# Patient Record
Sex: FEMALE
[De-identification: names, ages, dates, MRNs, and addresses within clinical notes are randomized per-mention and may not be internally consistent; named-entity substitution may affect disease eponyms.]

---

## 2017-11-24 ENCOUNTER — HOSPITAL ENCOUNTER (OUTPATIENT)
Dept: HOSPITAL 41 - JD.SDS | Age: 52
Discharge: HOME | End: 2017-11-24
Attending: OBSTETRICS & GYNECOLOGY
Payer: COMMERCIAL

## 2017-11-24 DIAGNOSIS — Z79.899: ICD-10-CM

## 2017-11-24 DIAGNOSIS — N81.6: ICD-10-CM

## 2017-11-24 DIAGNOSIS — N81.10: ICD-10-CM

## 2017-11-24 DIAGNOSIS — N83.331: Primary | ICD-10-CM

## 2017-11-24 DIAGNOSIS — Z87.891: ICD-10-CM

## 2017-11-24 DIAGNOSIS — F32.9: ICD-10-CM

## 2017-11-24 DIAGNOSIS — Z98.890: ICD-10-CM

## 2017-11-24 PROCEDURE — 87086 URINE CULTURE/COLONY COUNT: CPT

## 2017-11-24 PROCEDURE — 58661 LAPAROSCOPY REMOVE ADNEXA: CPT

## 2017-11-24 PROCEDURE — 85025 COMPLETE CBC W/AUTO DIFF WBC: CPT

## 2017-11-24 PROCEDURE — 93005 ELECTROCARDIOGRAM TRACING: CPT

## 2017-11-24 PROCEDURE — 80048 BASIC METABOLIC PNL TOTAL CA: CPT

## 2017-11-24 PROCEDURE — 86850 RBC ANTIBODY SCREEN: CPT

## 2017-11-24 PROCEDURE — 81001 URINALYSIS AUTO W/SCOPE: CPT

## 2017-11-24 PROCEDURE — 36415 COLL VENOUS BLD VENIPUNCTURE: CPT

## 2017-11-24 PROCEDURE — 86900 BLOOD TYPING SEROLOGIC ABO: CPT

## 2017-11-24 PROCEDURE — 57260 CMBN ANT PST COLPRHY: CPT

## 2017-11-24 PROCEDURE — 86901 BLOOD TYPING SEROLOGIC RH(D): CPT

## 2017-11-24 RX ADMIN — LIDOCAINE HYDROCHLORIDE AND EPINEPHRINE ONE ML: 10; 10 INJECTION, SOLUTION INFILTRATION; PERINEURAL at 10:15

## 2017-11-24 RX ADMIN — FENTANYL CITRATE PRN MCG: 50 INJECTION, SOLUTION INTRAMUSCULAR; INTRAVENOUS at 11:33

## 2017-11-24 RX ADMIN — LIDOCAINE HYDROCHLORIDE AND EPINEPHRINE ONE ML: 10; 10 INJECTION, SOLUTION INFILTRATION; PERINEURAL at 10:01

## 2017-11-24 RX ADMIN — FENTANYL CITRATE PRN MCG: 50 INJECTION, SOLUTION INTRAMUSCULAR; INTRAVENOUS at 11:16

## 2017-11-24 RX ADMIN — SODIUM CHLORIDE ONE ML: 9 INJECTION, SOLUTION INTRAMUSCULAR; INTRAVENOUS; SUBCUTANEOUS at 10:15

## 2017-11-24 RX ADMIN — SODIUM CHLORIDE ONE ML: 9 INJECTION, SOLUTION INTRAMUSCULAR; INTRAVENOUS; SUBCUTANEOUS at 10:02

## 2017-11-24 NOTE — PCM.OPNOTE
- General Post-Op/Procedure Note


Date of Surgery/Procedure: 11/24/17


Operative Procedure(s): Laparoscopy with lysis of adhesions and right salpingo-

oophorectomy, anterior and posterior repair


Findings: 





Normal appearing liver and upper abdomen, lower pelvis with adhesions of 

omentum to anterior pelvis wall, remnant of ovary and adnexal structures at 

pelvic brim on the right side, right fallopian tube and ovary present at the 

pelvic inlet, otherwise normal-appearing pelvis


Pre Op Diagnosis: Cystocele with rectocele, dyspareunia in female, change in 

bowel movements, acute constipation, pelvic pain in female


Post-Op Diagnosis: Same


Anesthesia Technique: General ET Tube


Primary Surgeon: Sohail Ybarra


Secondary Surgeon: Nathan Beltran


Anesthesia Provider: Brandi Tinajero


Reason Assistant Was Necessary: 





Laparoscopy trained assistant and patient safety


Role of Assistant: 





Assistant with laparoscopy and components, assist with vaginal surgery


Pathology: 





Right pelvic brim peritoneum, right fallopian tube and ovary


Fluid Replacement, Intraop: 1,800


Output, Urine Amount: 400


EBL in mLs: 100


Complications: None


Condition: Good


Free Text/Narrative:: 





Procedure in detail: Patient was seen in the preoperative holding area and 

discussed plans for surgery.  Consents were reviewed and patient desires to 

proceed with diagnostic laparoscopy, lysis of adhesions, anterior and posterior 

repair of the vagina.  The patient was taken back to the operating room and 

given general anesthesia with endotracheal tube that was placed without 

difficulty.  She was placed in dorsal lithotomy position using yellowfin 

stirrups.  She was prepped and draped in normal sterile fashion.  A timeout was 

held to confirm the correct patient, correct procedure and correct site.





Attention was turned to the patient's abdomen and local anesthetic of 1% 

lidocaine was injected infraumbilically. A 5-mm incision was made with the 

scalpel. A Veress needle was inserted through the infraumbilical incision. The 

gas was turned on. The opening pressure was noted to be 8 mmHg. A 

pneumoperitoneum was made until 15 mmHg were noted. A 5-mm trocar was inserted 

under direct visualization of the laparoscope. Attention was turned to the left 

lower quadrant, where local anesthetic was injected approximately jail 

between the ASIS and the umbilicus. A 5-mm incision was made with the scalpel. 

A 5-mm trocar was inserted under direct visualization with the laparoscope. The 

suprapubic area was then injected with local anesthetic approximately 2 cm 

above the pubic symphysis in the midline and a scalpel was used to make a 5 mm 

incision. A 5-mm trocar was inserted under direct visualization.  The pelvis 

was then inspected and there was noted to be adhesions of the omentum and bowel 

to the anterior pelvis. There was noted to be ovarian remnants on the right 

side of the pelvis at the pelvic brim and ovary with remnant fallopian tube at 

the right pelvic inlet. Attention was then turned to the right lower quadrant 

and local anesthetic was injected subcutaneously approximately jail between 

the right side ASIS and the umbilicus, and a 5-mm incision was made using the 

scalpel. A 5-mm trocar was inserted under direct visualization with the 

laparoscope.   Attention was then turned to the pelvis, where the ovarian 

remnant tissue that was previously seen at the right pelvic brim was excised 

using a Harmonic scalpel. This was sent for pathology. The right ovary and 

fallopian tube was then grasped and excised using the Harmonic scalpel. 

Hemostasis was present from the surgical bed. The suprapubic port was extended 

to a 10 mm incision and a 10 mm trocar was inserted into the abdomen under 

direct visualization. An Endocatch bag was inserted into the pelvis and was 

used to remove the right ovary and fallopian tube.  In the low pelvis there was 

noted to be some adhesions of the omentum and bowel to the anterior pelvis. A 

portion of the adhesions that was distant from the bowel mucosa were taken down 

using the Harmonic scalpel. The bowel had a significant amount of adhesions 

along the left side of the pelvis and abdominal sidewall. Attempts were made to 

try to visualize the left ovary but unable to be seen due to these adhesions. 

The laparoscopic portion of the case was complete at this time. The gas was 

removed from the abdomen. The suprapubic port fascia was closed using running 

suture of 0-Vicryl on a UR-6 needle. The skin incision was closed with 3-0 

Monocryl in a running fashion in the suprapubic port. Dermabond skin glue was 

used over the port sites to close the remaining ports and to cover the skin 

incision of the suprapubic port.





Attention was then turned to the pelvis for the anterior and posterior repair.  

There was noted to be a grade 2 cystocele and rectocele.  The vaginal mucosa 

overlying the bladder was grasped using Allis clamps and was injected using 1% 

lidocaine with epinephrine.  The mucosa was then excised in a triangular 

fashion using a scalpel and Metzenbaum scissors.  On the lateral sides of the 

bladder the mucosa was undermined using Metzenbaum scissors.  The vesicovaginal 

fascia was then reapproximated using 0-Monocryl suture with box sutures.  The 

edges of the vaginal mucosa was trimmed further using Metzenbaum sutures.  The 

incision was closed using 3-0 Monocryl sutures in a running locked fashion.  

The rectal vaginal mucosa was then grasped using Allis clamps and injected with 

1% lidocaine with epinephrine and this was excised using a scalpel and 

Metzenbaum scissors. The mucosa was dissected off of the underlying rectum and 

the rectovaginal fascia was reapproximated using box sutures of 0-Monocryl 

suture.  The incision was then closed using 3-0 Monocryl suture in a running 

locked fasihion to the hymenal ring and then the remaining portion was closed 

in running fashion.  Vaginal exam had good support of the bladder and rectum.  

The procedure was complete at this time.





The patient tolerated the procedure well. She was taken back to the PACU, where 

she was observed for appropriate pain control, a voiding trial and ambulation 

trial. She was given strict postoperative precautions and will follow up in 2 

weeks or sooner if there are any problems that arise.

## 2017-11-24 NOTE — PCM.POSTAN
POST ANESTHESIA ASSESSMENT





- MENTAL STATUS


Mental Status: Alert, Oriented





- VITAL SIGNS


Pulse Rate: 87


SaO2: 100


Resp Rate: 9


Blood Pressure: 112/68


Temperature: 36.6 C





- RESPIRATORY


Respiratory Status: Respiratory Rate WNL, Airway Patent, O2 Saturation Stable, 

Supplemental Oxygen





- CARDIOVASCULAR


CV Status: Pulse Rate WNL, Blood Pressure Stable





- GASTROINTESTINAL


GI Status: No Symptoms





- PAIN


Pain Score: 0





- POST OP HYDRATION


Hydration Status: Adequate & Stable

## 2017-11-24 NOTE — PCM.PREANE
Preanesthetic Assessment





- Anesthesia/Transfusion/Family Hx


Anesthesia History: Prior Anesthesia Without Reaction


Family History of Anesthesia Reaction: No


Intubation History: Unknown





- Review of Systems


General: No Symptoms


Pulmonary: No Symptoms (Currently smoking less than 1ppd.)


Cardiovascular: No Symptoms


Gastrointestinal: Abdominal Pain


Neurological: No Symptoms


Other: Reports: Depression





- Physical Assessment


NPO Status Date: 11/23/17


NPO Status Time: 20:00


O2 Sat by Pulse Oximetry: 96


Respiratory Rate: 16


Vital Signs: 





 Last Vital Signs











Temp  36.6 C   11/24/17 07:40


 


Pulse  83   11/24/17 07:40


 


Resp  16   11/24/17 07:40


 


BP  109/68   11/24/17 07:40


 


Pulse Ox  96   11/24/17 07:40











Height: 1.57 m


Weight: 61.235 kg


ASA Class: 2


Mental Status: Alert & Oriented x3


Airway Class: Mallampati = 1


Dentition: Reports: Dentures (Partial Dentures removed at home. )


Thyro-Mental Finger Breadths: 3


Mouth Opening Finger Breadths: 3


ROM/Head Extension: Full


Lungs: Clear to Auscultation, Normal Respiratory Effort


Cardiovascular: Regular Rate, Regular Rhythm





- Lab


Values: 





 Laboratory Last Values











WBC  7.99 K/mm3 (3.98-10.04)   11/24/17  08:15    


 


RBC  4.39 M/mm3 (3.98-5.22)   11/24/17  08:15    


 


Hgb  14.0 gm/L (11.2-15.7)   11/24/17  08:15    


 


Hct  41.3 % (34.1-44.9)   11/24/17  08:15    


 


MCV  94.1 fl (79.4-94.8)   11/24/17  08:15    


 


MCH  31.9 pg (25.6-32.2)   11/24/17  08:15    


 


MCHC  33.9 g/dl (32.2-35.5)   11/24/17  08:15    


 


RDW Std Deviation  44.5 fL (36.4-46.3)   11/24/17  08:15    


 


Plt Count  309 K/mm3 (182-369)   11/24/17  08:15    


 


MPV  10.0 fl (9.4-12.3)   11/24/17  08:15    


 


Neut % (Auto)  57.9 % (34.0-71.1)   11/24/17  08:15    


 


Lymph % (Auto)  29.3 % (19.3-51.7)   11/24/17  08:15    


 


Mono % (Auto)  7.6 % (4.7-12.5)   11/24/17  08:15    


 


Eos % (Auto)  4.6  (0.7-5.8)   11/24/17  08:15    


 


Baso % (Auto)  0.5 % (0.1-1.2)   11/24/17  08:15    


 


Neut # (Auto)  4.62 K/mm3 (1.56-6.13)   11/24/17  08:15    


 


Lymph # (Auto)  2.34 K/mm3 (1.18-3.74)   11/24/17  08:15    


 


Mono # (Auto)  0.61 K/mm3 (0.24-0.36)  H  11/24/17  08:15    


 


Eos # (Auto)  0.37 K/mm3 (0.04-0.36)  H  11/24/17  08:15    


 


Baso # (Auto)  0.04 K/mm3 (0.01-0.08)   11/24/17  08:15    


 


Sodium  141 mEq/L (136-145)   11/24/17  08:29    


 


Potassium  4.1 mEq/L (3.5-5.1)   11/24/17  08:29    


 


Chloride  108 mEq/L ()  H  11/24/17  08:29    


 


Carbon Dioxide  26 mEq/L (21-32)   11/24/17  08:29    


 


Anion Gap  11.1  (5-15)   11/24/17  08:29    


 


BUN  10 mg/dL (7-18)   11/24/17  08:29    


 


Creatinine  0.8 mg/dL (0.55-1.02)   11/24/17  08:29    


 


Est Cr Clr Drug Dosing  63.58 mL/min  11/24/17  08:29    


 


Estimated GFR (MDRD)  > 60 mL/min (>60)   11/24/17  08:29    


 


BUN/Creatinine Ratio  12.5  (14-18)  L  11/24/17  08:29    


 


Glucose  93 mg/dL ()   11/24/17  08:29    


 


Calcium  8.4 mg/dL (8.5-10.1)  L  11/24/17  08:29    


 


Urine Color  Yellow  (Yellow)   11/24/17  07:54    


 


Urine Appearance  Clear  (Clear)   11/24/17  07:54    


 


Urine pH  6.5  (5.0-8.0)   11/24/17  07:54    


 


Ur Specific Gravity  1.020  (1.005-1.030)   11/24/17  07:54    


 


Urine Protein  Negative  (Negative)   11/24/17  07:54    


 


Urine Glucose (UA)  Negative  (Negative)   11/24/17  07:54    


 


Urine Ketones  Negative  (Negative)   11/24/17  07:54    


 


Urine Occult Blood  Negative  (Negative)   11/24/17  07:54    


 


Urine Nitrite  Negative  (Negative)   11/24/17  07:54    


 


Urine Bilirubin  Negative  (Negative)   11/24/17  07:54    


 


Urine Urobilinogen  0.2  (0.2-1.0)   11/24/17  07:54    


 


Ur Leukocyte Esterase  Negative  (Negative)   11/24/17  07:54    


 


Urine RBC  0-5 /hpf (0-5)   11/24/17  07:54    


 


Urine WBC  Not seen /hpf (0-5)   11/24/17  07:54    


 


Ur Epithelial Cells  0-5 /hpf (0-5)   11/24/17  07:54    


 


Urine Bacteria  Rare /hpf (FEW)   11/24/17  07:54    


 


Urine Mucus  Not seen /hpf (FEW)   11/24/17  07:54    


 


Blood Type  O POSITIVE   11/24/17  08:15    


 


Gel Antibody Screen  Negative   11/24/17  08:15    














- Imaging/EKG


Impressions: 





Reviewed.





- Allergies


Allergies/Adverse Reactions: 


 Allergies











Allergy/AdvReac Type Severity Reaction Status Date / Time


 


No Known Allergies Allergy   Verified 11/24/17 08:30














- Acknowledgements


Anesthesia Type Planned: General Anesthesia


Pt an Appropriate Candidate for the Planned Anesthesia: Yes


Alternatives and Risks of Anesthesia Discussed w Pt/Guardian: Yes


Pt/Guardian Understands and Agrees with Anesthesia Plan: Yes





PreAnesthesia Questionnaire


HEENT History: Reports: None


Cardiovascular History: Reports: None


Respiratory History: Reports: None


Gastrointestinal History: Reports: Chronic Constipation


Genitourinary History: Reports: Other (See Below)


Other Genitourinary History: enterocele, rectocele, pelvic pressure


OB/GYN History: Reports: Other (See Below)


Other OB/BYN History: dyspareunia, A&P repair, vaginal prolapse, bladder sling 

placed and removed


Musculoskeletal History: Reports: None


Neurological History: Reports: None


Psychiatric History: Reports: Depression


Endocrine/Metabolic History: Reports: None


Hematologic History: Reports: None


Immunologic History: Reports: None


Oncologic (Cancer) History: Reports: None


Dermatologic History: Reports: None





- Past Surgical History


Head Surgeries/Procedures: Reports: None


HEENT Surgical History: Reports: None


Cardiovascular Surgical History: Reports: None


Respiratory Surgical History: Reports: None


GI Surgical History: Reports: Colonoscopy, Hernia, Inguinal


Female  Surgical History: Reports: D&C, Hysterectomy


Male  Surgical History: Reports: None


Endocrine Surgical History: Reports: None


Neurological Surgical History: Reports: None


Musculoskeletal Surgical History: Reports: None


Oncologic Surgical History: Reports: None


Dermatological Surgical History: Reports: None





- SUBSTANCE USE


Smoking Status *Q: Current Some Day Smoker


Recreational Drug Use History: No





- HOME MEDS


Home Medications: 


 Home Meds





Escitalopram Oxalate [Escitalopram Oxalate] 10 mg PO DAILY 11/22/17 [History]


Gabapentin [Neurontin] 200 mg PO BID 11/22/17 [History]


LORazepam [LORazepam] 0.5 mg PO DAILY 11/22/17 [History]











- CURRENT (IN HOUSE) MEDS


Current Meds: 





 Current Medications





Lactated Ringer's (Ringers, Lactated)  1,000 mls @ 125 mls/hr IV ASDIRECTED LAKESHA


   Last Admin: 11/24/17 08:00 Dose:  125 mls/hr


Lidocaine/Sodium Bicarbonate (Buffered Lidocaine 1% In Ns 8.4%)  0.25 ml IV 

ONETIME PRN


   PRN Reason: Prior to IV Start


   Last Admin: 11/24/17 08:00 Dose:  0.25 ml


Sodium Chloride (Saline Flush)  10 ml FLUSH ASDIRECTED PRN


   PRN Reason: Keep Vein Open





Discontinued Medications





Bupivacaine HCl (Marcaine 0.5%) Confirm Administered Dose 30 ml .ROUTE .STK-MED 

ONE


   Stop: 11/24/17 07:58


Cefazolin Sodium (Ancef) Confirm Administered Dose 2 gm .ROUTE .STK-MED ONE


   Stop: 11/24/17 07:07


Dexamethasone (Dexamethasone) Confirm Administered Dose 4 mg .ROUTE .STK-MED ONE


   Stop: 11/24/17 07:07


Fentanyl (Sublimaze) Confirm Administered Dose 250 mcg .ROUTE .STK-MED ONE


   Stop: 11/24/17 07:08


Lactated Ringer's (Ringers, Lactated) Confirm Administered Dose 1,000 mls @ as 

directed .ROUTE .ST-MED ONE


   Stop: 11/24/17 07:07


Lidocaine HCl (Xylocaine-Mpf 1%) Confirm Administered Dose 4 mls @ as directed 

.ROUTE .ST-MED ONE


   Stop: 11/24/17 07:08


Lidocaine/Epinephrine (Xylocaine 1% With Epinephrine 1:100,000) Confirm 

Administered Dose 20 ml .ROUTE .ST-MED ONE


   Stop: 11/24/17 07:58


Midazolam HCl (Versed 1 Mg/Ml) Confirm Administered Dose 2 mg .ROUTE .ST-MED 

ONE


   Stop: 11/24/17 07:08


Ondansetron HCl (Zofran) Confirm Administered Dose 4 mg .ROUTE .ST-MED ONE


   Stop: 11/24/17 07:07


Propofol (Diprivan  20 Ml) Confirm Administered Dose 400 mg .ROUTE .ST-MED ONE


   Stop: 11/24/17 07:07


Rocuronium Bromide (Zemuron) Confirm Administered Dose 50 mg .ROUTE .ST-MED ONE


   Stop: 11/24/17 07:07


Sodium Chloride (Normal Saline) Confirm Administered Dose 50 ml .ROUTE .[facility]-MED 

ONE


   Stop: 11/24/17 07:58

## 2017-11-24 NOTE — PCM.HPR
H & P Addendum review





- H & P Addendum Review


Date of Original H & P: 11/22/17


Date Reviewed: 11/24/17


Time Reviewed: 08:35


Patient was Examined: No Changes

## 2018-10-05 ENCOUNTER — HOSPITAL ENCOUNTER (EMERGENCY)
Dept: HOSPITAL 41 - JD.ED | Age: 53
Discharge: HOME | End: 2018-10-05
Payer: COMMERCIAL

## 2018-10-05 DIAGNOSIS — N39.0: Primary | ICD-10-CM

## 2018-10-05 DIAGNOSIS — Z79.899: ICD-10-CM

## 2018-10-05 DIAGNOSIS — Z87.891: ICD-10-CM

## 2018-10-05 PROCEDURE — 87086 URINE CULTURE/COLONY COUNT: CPT

## 2018-10-05 PROCEDURE — 36415 COLL VENOUS BLD VENIPUNCTURE: CPT

## 2018-10-05 PROCEDURE — 85007 BL SMEAR W/DIFF WBC COUNT: CPT

## 2018-10-05 PROCEDURE — 74177 CT ABD & PELVIS W/CONTRAST: CPT

## 2018-10-05 PROCEDURE — 96375 TX/PRO/DX INJ NEW DRUG ADDON: CPT

## 2018-10-05 PROCEDURE — 83690 ASSAY OF LIPASE: CPT

## 2018-10-05 PROCEDURE — 87088 URINE BACTERIA CULTURE: CPT

## 2018-10-05 PROCEDURE — 80053 COMPREHEN METABOLIC PANEL: CPT

## 2018-10-05 PROCEDURE — 85027 COMPLETE CBC AUTOMATED: CPT

## 2018-10-05 PROCEDURE — 99284 EMERGENCY DEPT VISIT MOD MDM: CPT

## 2018-10-05 PROCEDURE — 96361 HYDRATE IV INFUSION ADD-ON: CPT

## 2018-10-05 PROCEDURE — 81001 URINALYSIS AUTO W/SCOPE: CPT

## 2018-10-05 PROCEDURE — 87184 SC STD DISK METHOD PER PLATE: CPT

## 2018-10-05 PROCEDURE — 96374 THER/PROPH/DIAG INJ IV PUSH: CPT

## 2018-10-05 NOTE — EDM.PDOC
ED HPI GENERAL MEDICAL PROBLEM





- General


Chief Complaint: Abdominal Pain


Stated Complaint: LOWER ABDOMINAL PAIN


Time Seen by Provider: 10/05/18 10:42


Source of Information: Reports: Patient, RN Notes Reviewed


History Limitations: Reports: No Limitations





- History of Present Illness


INITIAL COMMENTS - FREE TEXT/NARRATIVE: 





The patient states that she developed nausea and vomiting this past Wednesday, 

10/3/2018. Shortly afterwards, she developed right sided abdominal pain. Is 

sharp and burning in character. It does not radiate. It comes and goes, and the 

patient has not identified any modifiers. She denies having any diarrhea or 

urinary symptoms. No recent fever. No prior similar symptoms.





The patient denies eating a bad or spoiled food within the last week. Not of 

her close contacts are similarly ill. No recent antibiotics. No recent travel.





The patient's PCP is Dr. Julissa Luu.








  ** Bilateral Lower Abdominal


Pain Score (Numeric/FACES): 5





- Related Data


 Allergies











Allergy/AdvReac Type Severity Reaction Status Date / Time


 


No Known Allergies Allergy   Verified 10/05/18 10:23











Home Meds: 


 Home Meds





Gabapentin [Neurontin] 600 mg PO DAILY 11/22/17 [History]


Estradiol [Divigel] 1 gm TD ASDIRECTED 10/05/18 [History]


Sulfamethoxazole/Trimethoprim [Bactrim Ds Tablet] 1 tab PO Q12H #6 tablet 10/05/

18 [Rx]











Past Medical History


Gastrointestinal History: Reports: PUD


Genitourinary History: Reports: Renal Calculus


OB/GYN History: Reports: Other (See Below)


Musculoskeletal History: Reports: Other (See Below) (Lumbar disc disease)


Psychiatric History: Reports: Depression





- Past Surgical History


HEENT Surgical History: Reports: Oral Surgery (wisdom teeth extraction)


GI Surgical History: Reports: Colonoscopy, EGD, Hernia, Inguinal (right, with 

revision)


Female  Surgical History: Reports: D&C, Hysterectomy, Lithotripsy/ESWL, 

Oophorectomy (bilateral), Ureteral Stent, Other (See Below) (Rectocele, 

cystocele, bladder sling)





Social & Family History





- Tobacco Use


Smoking Status *Q: Former Smoker


Years of Tobacco use: 37


Packs/Tins Daily: 0.4


Month/Year Tobacco Last Used: Quit 2017





- Caffeine Use


Caffeine Use: Reports: Coffee





- Alcohol Use


Alcohol Use History: Yes


Alcohol Use Frequency: Rarely





- Recreational Drug Use


Recreational Drug Use: No





- Living Situation & Occupation


Living situation: Reports: , with Spouse


Occupation: Employed ()





ED ROS GENERAL





- Review of Systems


Review Of Systems: ROS reveals no pertinent complaints other than HPI.





ED EXAM, GI/ABD





- Physical Exam


Exam: See Below


Exam Limited By: No Limitations


General Appearance: Alert, WD/WN, No Apparent Distress


Eyes: Bilateral: Normal Appearance, EOMI


Ears: Normal External Exam, Hearing Grossly Normal


Nose: Normal Inspection


Throat/Mouth: Normal Inspection, Normal Lips, Normal Voice, No Airway Compromise


Head: Atraumatic, Normocephalic


Neck: Normal Inspection, Full Range of Motion


Respiratory/Chest: No Respiratory Distress, Lungs Clear, Normal Breath Sounds, 

No Accessory Muscle Use


Cardiovascular: Normal Peripheral Pulses, Regular Rate, Rhythm, No Edema, No 

Gallop, No JVD, No Murmur, No Rub


GI/Abdominal Exam: Normal Bowel Sounds, Soft, No Organomegaly, No Distention, 

No Abnormal Bruit, No Mass, Tender (Entire right abdomen, particularly the mid-

right abdomen. Nontender to the left abdomen, and Rovsing sign negative.).  No: 

Rebound


 (Female) Exam: Deferred


Rectal (Female) Exam: Deferred


Back Exam: Normal Inspection, Full Range of Motion.  No: CVA Tenderness (L), 

CVA Tenderness (R)


Extremities: Normal Inspection, Normal Range of Motion, No Pedal Edema, Normal 

Capillary Refill


Neurological: Alert, Oriented, Normal Cognition, No Motor/Sensory Deficits


Psychiatric: Normal Affect


Skin Exam: Warm, Dry, Intact, Normal Color, No Rash





Course





- Vital Signs


Last Recorded V/S: 


 Last Vital Signs











Temp  37.0 C   10/05/18 10:19


 


Pulse  98   10/05/18 10:19


 


Resp  19   10/05/18 10:19


 


BP  120/89   10/05/18 10:19


 


Pulse Ox  98   10/05/18 10:19














- Orders/Labs/Meds


Orders: 


 Active Orders 24 hr











 Category Date Time Status


 


 CULTURE URINE [RM] Stat Lab  10/05/18 10:30 Received


 


 Sodium Chloride 0.9% [Normal Saline] 1,000 ml Med  10/05/18 11:15 Active





 IV ASDIRECTED   


 


 Sodium Chloride 0.9% [Saline Flush] Med  10/05/18 11:12 Active





 10 ml FLUSH ONETIME PRN   








 Medication Orders





Sodium Chloride (Normal Saline)  1,000 mls @ 150 mls/hr IV ASDIRECTED LAKESHA


   Last Admin: 10/05/18 11:17  Dose: 150 mls/hr


Sodium Chloride (Saline Flush)  10 ml FLUSH ONETIME PRN


   PRN Reason: IV FLUSH


   Last Admin: 10/05/18 11:51  Dose: 10 ml








Labs: 


 Laboratory Tests











  10/05/18 10/05/18 10/05/18 Range/Units





  10:30 11:20 11:20 


 


WBC   10.65 H   (3.98-10.04)  K/mm3


 


RBC   4.32   (3.98-5.22)  M/mm3


 


Hgb   13.7   (11.2-15.7)  gm/L


 


Hct   41.4   (34.1-44.9)  %


 


MCV   95.8 H   (79.4-94.8)  fl


 


MCH   31.7   (25.6-32.2)  pg


 


MCHC   33.1   (32.2-35.5)  g/dl


 


RDW Std Deviation   44.1   (36.4-46.3)  fL


 


Plt Count   329   (182-369)  K/mm3


 


MPV   10.3   (9.4-12.3)  fl


 


Neutrophils % (Manual)   71 H   (40-60)  %


 


Band Neutrophils %   0   (0-10)  %


 


Lymphocytes % (Manual)   22   (20-40)  %


 


Atypical Lymphs %   0   %


 


Monocytes % (Manual)   3   (2-10)  %


 


Eosinophils % (Manual)   4   (0.7-5.8)  %


 


Basophils % (Manual)   0 L   (0.1-1.2)  


 


Platelet Estimate   Adequate   


 


RBC Morph Comment   Normal   


 


Sodium    139  (136-145)  mEq/L


 


Potassium    3.8  (3.5-5.1)  mEq/L


 


Chloride    104  ()  mEq/L


 


Carbon Dioxide    26  (21-32)  mEq/L


 


Anion Gap    12.8  (5-15)  


 


BUN    7  (7-18)  mg/dL


 


Creatinine    0.8  (0.55-1.02)  mg/dL


 


Est Cr Clr Drug Dosing    63.58  mL/min


 


Estimated GFR (MDRD)    > 60  (>60)  mL/min


 


BUN/Creatinine Ratio    8.8 L  (14-18)  


 


Glucose    93  ()  mg/dL


 


Calcium    8.9  (8.5-10.1)  mg/dL


 


Total Bilirubin    0.2  (0.2-1.0)  mg/dL


 


AST    16  (15-37)  U/L


 


ALT    18  (14-59)  U/L


 


Alkaline Phosphatase    76  ()  U/L


 


Total Protein    7.3  (6.4-8.2)  g/dl


 


Albumin    3.7  (3.4-5.0)  g/dl


 


Globulin    3.6  gm/dL


 


Albumin/Globulin Ratio    1.0  (1-2)  


 


Lipase    152  ()  U/L


 


Urine Color  Yellow    (Yellow)  


 


Urine Appearance  Clear    (Clear)  


 


Urine pH  7.0    (5.0-8.0)  


 


Ur Specific Gravity  1.015    (1.005-1.030)  


 


Urine Protein  Negative    (Negative)  


 


Urine Glucose (UA)  Negative    (Negative)  


 


Urine Ketones  Negative    (Negative)  


 


Urine Occult Blood  Negative    (Negative)  


 


Urine Nitrite  Negative    (Negative)  


 


Urine Bilirubin  Negative    (Negative)  


 


Urine Urobilinogen  0.2    (0.2-1.0)  


 


Ur Leukocyte Esterase  1+ H    (Negative)  


 


Urine RBC  0-5    (0-5)  /hpf


 


Urine WBC  5-10 H    (0-5)  /hpf


 


Ur Epithelial Cells  0-5    (0-5)  /hpf


 


Urine Bacteria  Many H    (FEW)  /hpf


 


Urine Mucus  Not seen    (FEW)  /hpf











Meds: 


Medications











Generic Name Dose Route Start Last Admin





  Trade Name Sharla  PRN Reason Stop Dose Admin


 


Sodium Chloride  1,000 mls @ 150 mls/hr  10/05/18 11:15  10/05/18 11:17





  Normal Saline  IV   150 mls/hr





  ASDIRECTED LAKESHA   Administration





     





     





     





     


 


Sodium Chloride  10 ml  10/05/18 11:12  10/05/18 11:51





  Saline Flush  FLUSH   10 ml





  ONETIME PRN   Administration





  IV FLUSH   





     





     





     














Discontinued Medications














Generic Name Dose Route Start Last Admin





  Trade Name Freq  PRN Reason Stop Dose Admin


 


Diatrizoate Meglum/Diatrizoate Sod  120 ml  10/05/18 11:12  10/05/18 11:51





  Gastrografin 37%  PO  10/05/18 11:13  90 ml





  ONETIME ONE   Administration





     





     





     





     


 


Hydromorphone HCl  1 mg  10/05/18 11:04  10/05/18 11:18





  Dilaudid  IVPUSH  10/05/18 11:05  1 mg





  ONETIME STA   Administration





     





     





     





     


 


Iopamidol  100 ml  10/05/18 11:12  10/05/18 11:50





  Isovue-300 (61%)  IVPUSH  10/05/18 11:13  100 ml





  ONETIME ONE   Administration





     





     





     





     


 


Ondansetron HCl  4 mg  10/05/18 11:02  10/05/18 11:18





  Zofran  IVPUSH  10/05/18 11:03  4 mg





  ONETIME ONE   Administration





     





     





     





     


 


Trimethoprim/Sulfamethoxazole  1 tab  10/05/18 11:47  





  Septra Ds  PO  10/05/18 11:48  





  ONETIME ONE   





     





     





     





     














- Re-Assessments/Exams


Free Text/Narrative Re-Assessment/Exam: 





10/05/18 11:47


The patient's urinalysis is remarkable for 1+ leukocyte esterase, 5-10 WBCs, 

and many bacteria. This is consistent with a UTI. A urine culture has been 

ordered, and I will start the patient on oral Bactrim.








10/05/18 12:41


CT of the abdomen and pelvis with oral and IV contrast is read by Dr. Foote as:


1. Small hiatal hernia with mild gastroesophageal reflux.


2. Other incidental findings. Nothing acute is seen.








10/05/18 13:10


Test results discussed with the patient. Other than the possible urinary tract 

infection, for which the patient is are been started on Bactrim, the remainder 

of her workup was unremarkable, and does not explain her right-sided pain. I 

will prescribe 3 day course of Bactrim, and I would like the patient to follow-

up with her PCP, Dr. Luu, this coming week. If her symptoms worsen, I would 

like her to return to the ED for reevaluation.





Departure





- Departure


Time of Disposition: 13:10


Disposition: Home, Self-Care 01


Condition: Good


Clinical Impression: 


 Abdominal pain of unknown etiology, UTI (urinary tract infection)








- Discharge Information


*PRESCRIPTION DRUG MONITORING PROGRAM REVIEWED*: Not Applicable


*COPY OF PRESCRIPTION DRUG MONITORING REPORT IN PATIENT [facility]ODROW: Not Applicable


Referrals: 


Julissa Luu MD [Primary Care Provider] - 


Forms:  ED Department Discharge, ED Return to Work/School Form


Additional Instructions: 


You were seen in the emergency room for right-sided abdominal pain, along with 

nausea and vomiting.





Workup in the ER included blood work, a urinalysis, and a CT scan of your 

abdomen and pelvis with oral and IV contrast.





Your urinalysis indicates that you might have a urinary tract infection. A 

sample of your urine has been sent for culture.





You have been started on the antibiotic Bactrim. A prescription for Bactrim has 

been sent to the [facility], [address]. Take one tablet 

every 12 hours, starting this evening, Friday, 10/5/2018, as prescribed. Finish 

the entire prescription unless told otherwise by a doctor.





Stay adequately hydrated, so that you urinate fairly often. It does not really 

matter what fluid you drink.





The remainder of your workup was unremarkable, and does not explain your right-

sided abdominal pain and tenderness.





We recommend that you follow-up with your PCP, Dr. Julissa Luu, early this 

coming week, however, if your symptoms worsen, we recommend that you return to 

the ER for reevaluation.





- My Orders


Last 24 Hours: 


My Active Orders





10/05/18 10:30


CULTURE URINE [RM] Stat 





10/05/18 11:12


Sodium Chloride 0.9% [Saline Flush]   10 ml FLUSH ONETIME PRN 





10/05/18 11:15


Sodium Chloride 0.9% [Normal Saline] 1,000 ml IV ASDIRECTED 














- Assessment/Plan


Last 24 Hours: 


My Active Orders





10/05/18 10:30


CULTURE URINE [RM] Stat 





10/05/18 11:12


Sodium Chloride 0.9% [Saline Flush]   10 ml FLUSH ONETIME PRN 





10/05/18 11:15


Sodium Chloride 0.9% [Normal Saline] 1,000 ml IV ASDIRECTED

## 2018-10-05 NOTE — CT
CT abdomen and pelvis

 

Technique: Multiple axial sections were obtained from above the dome 

of the diaphragm inferiorly through the pubic symphysis.  Intravenous 

and oral contrast is present.  Oral contrast remains within the 

stomach.  Delayed images were obtained through the bladder.

 

Comparison: No prior abdominal CT study.

 

Findings: Visualized lung bases show nothing acute.  Small hiatal 

hernia is seen.  Mild gastroesophageal reflux is seen into the 

esophagus.

 

Liver shows no focal parenchymal abnormality.  Spleen appears within 

normal limits.  Adrenal glands show no nodule.  Pancreas appears 

within normal limits.  Aorta shows no aneurysm.  No retroperitoneal 

adenopathy is seen.  Appendix is seen which is normal in size.  No 

pelvic mass or adenopathy is seen.  No free fluid or inflammatory 

change is seen.  No bowel dilatation is seen.

 

Gallbladder shows no calcified gallstones.

 

Bone window settings were reviewed which shows severe disc space 

narrowing at L5-S1 with vacuum phenomena with posterior disc bulging 

and posterior spurring.  Lesser degenerative change is seen within the

 thoracic spine.

 

Impression:

1.  Small hiatal hernia with mild gastroesophageal reflux.

2.  Other incidental findings.  Nothing acute is seen.

 

Diagnostic code #2

## 2020-08-28 NOTE — EDM.PDOC
ED HPI GENERAL MEDICAL PROBLEM





- General


Chief Complaint: Back Pain or Injury


Stated Complaint: BACK PAIN


Time Seen by Provider: 08/28/20 10:57


Source of Information: Reports: Patient, RN Notes Reviewed


History Limitations: Reports: No Limitations





- History of Present Illness


INITIAL COMMENTS - FREE TEXT/NARRATIVE: 





Patient is a 54-year-old female who presents to the ED for evaluation of her 

ongoing back pain.  Patient notes she has degenerative disc disease in her back,

and she cannot remember any specific trauma or movements that she had made to 

make the back pain worse.  She does note that this is been going on for about 4 

days.  She was seen in the walk-in clinic on Wednesday, August 26, given an 

injection of Toradol, some p.o. prednisone 40 mg daily x5 days-, and Flexeril 

for management, she also had an x-ray taken.  The providers are told that she 

needed an MRI and she should follow-up with her regular provider Leighann Frances 

for this.  Patient notes she has been trying to get a hold of Leighann, but she 

is been out of the office and not taking appointments at this time.  She notes 

that the pain is still radiating down her right leg, and feels like there is 

pins-and-needles.  She states is very difficult to get out of bed this morning 

due to the pain.  Otherwise she denies any sick-like symptoms, fever/chills, 

cough/shortness of breath, or any other issues.





- Related Data


                                    Allergies











Allergy/AdvReac Type Severity Reaction Status Date / Time


 


tramadol Allergy Severe Difficulty Verified 08/28/20 10:14





   Breathing  











Home Meds: 


                                    Home Meds





Cyclobenzaprine [Flexeril] 5 mg PO DAILY PRN 08/28/20 [History]


LORazepam [Ativan] 0.5 mg PO DAILY PRN 08/28/20 [History]


Rosuvastatin Calcium [Crestor] 40 mg PO DAILY 08/28/20 [History]


predniSONE [Prednisone] 40 mg PO ASDIRECTED 08/28/20 [History]











Past Medical History


Cardiovascular History: Reports: High Cholesterol


Gastrointestinal History: Reports: PUD


Genitourinary History: Reports: Renal Calculus


Other Genitourinary History: enterocele, rectocele, pelvic pressure


OB/GYN History: Reports: Other (See Below)


Other OB/GYN History: dyspareunia, A&P repair, vaginal prolapse, bladder sling 

placed and removed


Musculoskeletal History: Reports: Other (See Below)


Other Musculoskeletal History: Lumbar degenerative disc disease


Psychiatric History: Reports: Depression





- Past Surgical History


HEENT Surgical History: Reports: Oral Surgery


GI Surgical History: Reports: Colonoscopy, EGD, Hernia, Inguinal


Female  Surgical History: Reports: D&C, Hysterectomy, Lithotripsy/ESWL, 

Oophorectomy, Ureteral Stent, Other (See Below)





Social & Family History





- Family History


Family Medical History: Noncontributory





- Tobacco Use


Smoking Status *Q: Current Some Day Smoker


Years of Tobacco use: 20


Packs/Tins Daily: 0.1





- Caffeine Use


Caffeine Use: Reports: Coffee





- Living Situation & Occupation


Living situation: Reports: , with Spouse


Occupation: Employed ()





ED ROS GENERAL





- Review of Systems


Review Of Systems: Comprehensive ROS is negative, except as noted in HPI.





ED EXAM,LOWER BACK PAIN/INJURY





- Physical Exam


Exam: See Below


Exam Limited By: No Limitations


General Appearance: Alert, WD/WN, No Apparent Distress (patient laying on belly,

 as this is the position that is most comfortable)


Respiratory/Chest: No Respiratory Distress, Lungs Clear, Normal Breath Sounds, 

No Accessory Muscle Use, Chest Non-Tender


Cardiovascular: Normal Peripheral Pulses, Regular Rate, Rhythm, No Murmur


GI/Abdominal: Normal Bowel Sounds, Soft, Non-Tender, No Distention, No Mass


Extremities: Normal Inspection, Normal Capillary Refill


Neurological: Alert, Normal Mood/Affect, Normal Dorsiflexion, CN II-XII Intact, 

Normal Plantar Flexion, Oriented x 3, Straight Leg Raise (R).  No: Straight Leg 

Raise (L), Saddle Anesthesia


Psychiatric: Normal Affect, Normal Mood


Skin Exam: Warm, Dry, Intact, Normal Color, No Rash





Course





- Vital Signs


Last Recorded V/S: 





                                Last Vital Signs











Temp  97.7 F   08/28/20 10:08


 


Pulse  89   08/28/20 10:08


 


Resp  16   08/28/20 10:08


 


BP  107/65   08/28/20 10:08


 


Pulse Ox  100   08/28/20 10:08














- Orders/Labs/Meds


Meds: 





Medications














Discontinued Medications














Generic Name Dose Route Start Last Admin





  Trade Name Freq  PRN Reason Stop Dose Admin


 


Dexamethasone  10 mg  08/28/20 11:10 





  Dexamethasone  IM  08/28/20 11:11 





  ONETIME ONE  














- Re-Assessments/Exams


Free Text/Narrative Re-Assessment/Exam: 





08/28/20 11:19


Patient presents the ED for ongoing back pain.  I will provided with an order 

for MRI, I did tell her she will to follow-up with Leighann Frances for results, 

she is aware of this.  She will also get an injection of dexamethasone for 

further management of her back pain.





Departure





- Departure


Time of Disposition: 11:12


Disposition: Home, Self-Care 01


Condition: Good


Clinical Impression: 


 Low back pain radiating down leg








- Discharge Information


*PRESCRIPTION DRUG MONITORING PROGRAM REVIEWED*: No


*COPY OF PRESCRIPTION DRUG MONITORING REPORT IN PATIENT [facility]: No


Instructions:  Back Exercises, Easy-to-Read


Referrals: 


Leighann Frances PA-C [Primary Care Provider] - 


Forms:  ED Department Discharge, ED Return to Work/School Form


Additional Instructions: 


You were evaluated in the ER today for your low back pain.





This is most likely sciatica/radiculopathy which is nerve pain.





You have been given an injection of dexamethasone for further treatment, this is

an intramuscular steroid.





Please take all other medications as previously directed by your other 

providers.





You were given an outpatient order for a back MRI, our radiology department call

to schedule you for this appointment.  You will need to follow-up with Leighann Frances for results.  You may schedule this roughly 1 week after you have your 

MRI so she has some time to review the findings.





Please return to the ER at any time if your symptoms change or worsen.





Sepsis Event Note (ED)





- Evaluation


Sepsis Screening Result: No Definite Risk





- Focused Exam


Vital Signs: 





                                   Vital Signs











  Temp Pulse Resp BP Pulse Ox


 


 08/28/20 10:08  97.7 F  89  16  107/65  100

## 2021-09-29 NOTE — EDM.PDOC
<Stephanie Martin - Last Filed: 09/29/21 21:44>





ED HPI GENERAL MEDICAL PROBLEM





- General


Chief Complaint: Flank Pain


Stated Complaint: RIGHT SIDE/ABDOMEN/GROIN PAIN


Time Seen by Provider: 09/29/21 21:06


Source of Information: Reports: Patient, RN Notes Reviewed


History Limitations: Reports: No Limitations





- History of Present Illness


INITIAL COMMENTS - FREE TEXT/NARRATIVE: 


Patient is a 55-year-old female presenting to the emergency department with 

complaints of right-sided flank pain with radiation to her right lateral abdomen

and down into her groin.  Symptoms began this morning.  She was seen at the 

[facility] and had urine completed.  She states it showed trace lysed blood 

in her urine.  She is scheduled for CT scan tomorrow, however the pain became 

intolerable this evening.  She does have a history of kidney stones with her 

last being approximately 7 years ago.  This required stenting and lithotripsy.  

Denies fever but states she did vomit this evening.  She took Tylenol with 

little relief.  She received injection of Toradol in the clinic this morning.





  ** Right Flank


Pain Score (Numeric/FACES): 8





  ** Right Abdomen


Pain Score (Numeric/FACES): 8





- Related Data


                                    Allergies











Allergy/AdvReac Type Severity Reaction Status Date / Time


 


tramadol Allergy Severe Difficulty Verified 08/31/20 09:27





   Breathing,  





   Nausea &  





   Vomiting.  











Home Meds: 


                                    Home Meds





Cyclobenzaprine [Flexeril] 5 mg PO DAILY PRN 08/28/20 [History]


LORazepam [Ativan] 0.5 mg PO DAILY PRN 08/28/20 [History]


Rosuvastatin Calcium [Crestor] 40 mg PO DAILY 08/28/20 [History]


Albuterol Sulfate [Proair Digihaler] 90 mcg IH ASDIRECTED 08/31/20 [History]


Hydrocodone/Acetaminophen [HYDROcodone-Acetaminophen 5-325 MG] 1 each PO Q6HR 

PRN #14 tab 09/30/21 [Rx]











Past Medical History


Cardiovascular History: Reports: High Cholesterol


Respiratory History: Reports: None


Gastrointestinal History: Reports: PUD


Genitourinary History: Reports: Renal Calculus


Other Genitourinary History: enterocele, rectocele, pelvic pressure


OB/GYN History: Reports: Other (See Below)


Other OB/GYN History: dyspareunia, A&P repair, vaginal prolapse, bladder sling 

placed and removed


Musculoskeletal History: Reports: Other (See Below)


Other Musculoskeletal History: Lumbar degenerative disc disease


Neurological History: Reports: None


Psychiatric History: Reports: Depression


Endocrine/Metabolic History: Reports: None


Hematologic History: Reports: None


Immunologic History: Reports: None


Oncologic (Cancer) History: Reports: None


Dermatologic History: Reports: None





- Infectious Disease History


Infectious Disease History: Reports: Chicken Pox, Mumps





- Past Surgical History


Head Surgeries/Procedures: Reports: None


HEENT Surgical History: Reports: Oral Surgery


Cardiovascular Surgical History: Reports: None


Respiratory Surgical History: Reports: None


GI Surgical History: Reports: Colonoscopy, EGD, Hernia, Inguinal


Female  Surgical History: Reports: D&C, Hysterectomy, Lithotripsy/ESWL, 

Oophorectomy, Ureteral Stent, Other (See Below)


Endocrine Surgical History: Reports: None


Neurological Surgical History: Reports: None


Musculoskeletal Surgical History: Reports: None


Oncologic Surgical History: Reports: None


Dermatological Surgical History: Reports: None





Social & Family History





- Family History


Family Medical History: No Pertinent Family History





- Tobacco Use


Tobacco Use Status *Q: Current Every Day Tobacco User


Years of Tobacco use: 35


Packs/Tins Daily: 0.3





- Caffeine Use


Caffeine Use: Reports: Coffee, Soda





- Recreational Drug Use


Recreational Drug Use: No





- Living Situation & Occupation


Living situation: Reports: , with Spouse


Occupation: Employed ()





ED ROS GENERAL





- Review of Systems


Review Of Systems: See Below


Constitutional: Reports: No Symptoms.  Denies: Fever, Chills


HEENT: Reports: No Symptoms


Respiratory: Reports: No Symptoms


Cardiovascular: Reports: No Symptoms


Endocrine: Reports: No Symptoms


GI/Abdominal: Reports: Nausea, Vomiting.  Denies: Abdominal Pain


: Reports: Flank Pain


Musculoskeletal: Reports: No Symptoms


Skin: Reports: No Symptoms


Neurological: Reports: No Symptoms


Psychiatric: Reports: No Symptoms


Hematologic/Lymphatic: Reports: No Symptoms


Immunologic: Reports: No Symptoms





ED EXAM, RENAL/





- Physical Exam


Exam: See Below


Exam Limited By: No Limitations


General Appearance: Alert, Mild Distress


Respiratory/Chest: No Respiratory Distress, Lungs Clear, Normal Breath Sounds, 

No Accessory Muscle Use, Chest Non-Tender


Cardiovascular: Normal Peripheral Pulses, Regular Rate, Rhythm, No Edema, No 

Gallop, No JVD, No Murmur, No Rub


GI/Abdominal: Normal Bowel Sounds, Soft, Non-Tender, No Organomegaly, No 

Abnormal Bruit, No Mass, Distended (Mildly)


Back Exam: Normal Inspection, Full Range of Motion, CVA Tenderness (R).  No: CVA

 Tenderness (L)


Neurological: Alert, Oriented, CN II-XII Intact, Normal Cognition, Normal Gait, 

Normal Reflexes, No Motor/Sensory Deficits


Psychiatric: Normal Affect, Normal Mood


Skin Exam: Warm, Dry, Intact, Normal Color, No Rash





Departure





- Departure


Disposition: Home, Self-Care 01


Clinical Impression: 


 Kidney stone on right side








- Discharge Information


Prescriptions: 


Hydrocodone/Acetaminophen [HYDROcodone-Acetaminophen 5-325 MG] 1 each PO Q6HR 

PRN #14 tab


 PRN Reason: Pain


Referrals: 


Leighann Frances PA-C [Primary Care Provider] - 


Forms:  ED Department Discharge


Additional Instructions: 


Strain urine to watch for stone.  Drink plenty of fluids.  Tylenol for mild to 

moderate discomfort or hydrocodone if needed for severe pain. Prescription has 

been sent to [facility].   Follow up clinic if you do not catch a stone 

in the strainer in a few days and/or if discomfort persisting.  Call for appt.  

Return to ED as needed.   





<Giovanni Shipman - Last Filed: 09/30/21 02:03>





Course





- Vital Signs


Last Recorded V/S: 


                                Last Vital Signs











Temp  98.0 F   09/29/21 21:17


 


Pulse  96   09/29/21 21:17


 


Resp  20   09/29/21 21:17


 


BP  139/83   09/29/21 21:17


 


Pulse Ox  97   09/29/21 21:17














- Orders/Labs/Meds


Orders: 


                               Active Orders 24 hr











 Category Date Time Status


 


 Peripheral IV Care [RC] .AS DIRECTED Care  09/29/21 21:13 Active


 


 Abdomen Pelvis wo Cont [CT] Stat Exams  09/29/21 21:23 Taken


 


 Sodium Chloride 0.9% [Normal Saline] 1,000 ml Med  09/29/21 21:23 Active





 IV NOW   


 


 Sodium Chloride 0.9% [Saline Flush] Med  09/29/21 21:13 Active





 10 ml FLUSH ASDIRECTED PRN   


 


 Peripheral IV Insertion Adult [OM.PC] Stat Oth  09/29/21 21:13 Ordered








                                Medication Orders





Sodium Chloride (Normal Saline)  1,000 mls @ 150 mls/hr IV NOW STA


   Stop: 09/30/21 04:02


   Last Admin: 09/29/21 21:38  Dose: 150 mls/hr


   Documented by: MORAIMA


Sodium Chloride (Sodium Chloride 0.9% 10 Ml Syringe)  10 ml FLUSH ASDIRECTED PRN


   PRN Reason: Keep Vein Open


   Last Admin: 09/29/21 21:41  Dose: 10 ml


   Documented by: MORAIMA








Labs: 


                                Laboratory Tests











  09/29/21 09/29/21 09/29/21 Range/Units





  21:40 21:40 21:40 


 


WBC   9.70   (3.98-10.04)  K/mm3


 


RBC   4.20   (3.98-5.22)  M/mm3


 


Hgb   13.5   (11.2-15.7)  gm/dl


 


Hct   40.2   (34.1-44.9)  %


 


MCV   95.7 H   (79.4-94.8)  fl


 


MCH   32.1   (25.6-32.2)  pg


 


MCHC   33.6   (32.2-35.5)  g/dl


 


RDW Std Deviation   45.0   (36.4-46.3)  fL


 


Plt Count   346   (182-369)  K/mm3


 


MPV   10.5   (9.4-12.3)  fl


 


Neut % (Auto)   46.7   (34.0-71.1)  %


 


Lymph % (Auto)   36.2   (19.3-51.7)  %


 


Mono % (Auto)   10.0   (4.7-12.5)  %


 


Eos % (Auto)   6.2 H   (0.7-5.8)  


 


Baso % (Auto)   0.7   (0.1-1.2)  %


 


Neut # (Auto)   4.53   (1.56-6.13)  K/mm3


 


Lymph # (Auto)   3.51   (1.18-3.74)  K/mm3


 


Mono # (Auto)   0.97 H   (0.24-0.36)  K/mm3


 


Eos # (Auto)   0.60 H   (0.04-0.36)  K/mm3


 


Baso # (Auto)   0.07   (0.01-0.08)  K/mm3


 


Sodium    138  (136-145)  mEq/L


 


Potassium    3.8  (3.5-5.1)  mEq/L


 


Chloride    104  ()  mEq/L


 


Carbon Dioxide    27  (21-32)  mEq/L


 


Anion Gap    10.8  (5-15)  


 


BUN    8  (7-18)  mg/dL


 


Creatinine    0.7  (0.55-1.02)  mg/dL


 


Est Cr Clr Drug Dosing    70.13  mL/min


 


Estimated GFR (MDRD)    > 60  (>60)  mL/min


 


BUN/Creatinine Ratio    11.4 L  (14-18)  


 


Glucose    95  (70-99)  mg/dL


 


Calcium    8.6  (8.5-10.1)  mg/dL


 


Total Bilirubin    0.2  (0.2-1.0)  mg/dL


 


AST    14 L  (15-37)  U/L


 


ALT    18  (14-59)  U/L


 


Alkaline Phosphatase    73  ()  U/L


 


C-Reactive Protein     (<1.0)  mg/dL


 


Total Protein    7.5  (6.4-8.2)  g/dl


 


Albumin    3.9  (3.4-5.0)  g/dl


 


Globulin    3.6  gm/dL


 


Albumin/Globulin Ratio    1.1  (1-2)  


 


Urine Color  Light yellow    (Yellow)  


 


Urine Appearance  Clear    (Clear)  


 


Urine pH  7.0    (5.0-8.0)  


 


Ur Specific Gravity  1.020    (1.005-1.030)  


 


Urine Protein  Negative    (Negative)  


 


Urine Glucose (UA)  Negative    (Negative)  


 


Urine Ketones  Negative    (Negative)  


 


Urine Occult Blood  Negative    (Negative)  


 


Urine Nitrite  Negative    (Negative)  


 


Urine Bilirubin  Negative    (Negative)  


 


Urine Urobilinogen  0.2    (0.2-1.0)  


 


Ur Leukocyte Esterase  Negative    (Negative)  


 


Urine RBC  0-5    (0-5)  /hpf


 


Urine WBC  0-5    (0-5)  /hpf


 


Ur Squamous Epith Cells  0-5    (0-5)  /hpf


 


Urine Bacteria  Occasional    (FEW)  /hpf


 


Urine Mucus  Not seen    (FEW)  /hpf














  09/29/21 Range/Units





  21:40 


 


WBC   (3.98-10.04)  K/mm3


 


RBC   (3.98-5.22)  M/mm3


 


Hgb   (11.2-15.7)  gm/dl


 


Hct   (34.1-44.9)  %


 


MCV   (79.4-94.8)  fl


 


MCH   (25.6-32.2)  pg


 


MCHC   (32.2-35.5)  g/dl


 


RDW Std Deviation   (36.4-46.3)  fL


 


Plt Count   (182-369)  K/mm3


 


MPV   (9.4-12.3)  fl


 


Neut % (Auto)   (34.0-71.1)  %


 


Lymph % (Auto)   (19.3-51.7)  %


 


Mono % (Auto)   (4.7-12.5)  %


 


Eos % (Auto)   (0.7-5.8)  


 


Baso % (Auto)   (0.1-1.2)  %


 


Neut # (Auto)   (1.56-6.13)  K/mm3


 


Lymph # (Auto)   (1.18-3.74)  K/mm3


 


Mono # (Auto)   (0.24-0.36)  K/mm3


 


Eos # (Auto)   (0.04-0.36)  K/mm3


 


Baso # (Auto)   (0.01-0.08)  K/mm3


 


Sodium   (136-145)  mEq/L


 


Potassium   (3.5-5.1)  mEq/L


 


Chloride   ()  mEq/L


 


Carbon Dioxide   (21-32)  mEq/L


 


Anion Gap   (5-15)  


 


BUN   (7-18)  mg/dL


 


Creatinine   (0.55-1.02)  mg/dL


 


Est Cr Clr Drug Dosing   mL/min


 


Estimated GFR (MDRD)   (>60)  mL/min


 


BUN/Creatinine Ratio   (14-18)  


 


Glucose   (70-99)  mg/dL


 


Calcium   (8.5-10.1)  mg/dL


 


Total Bilirubin   (0.2-1.0)  mg/dL


 


AST   (15-37)  U/L


 


ALT   (14-59)  U/L


 


Alkaline Phosphatase   ()  U/L


 


C-Reactive Protein  <0.2  (<1.0)  mg/dL


 


Total Protein   (6.4-8.2)  g/dl


 


Albumin   (3.4-5.0)  g/dl


 


Globulin   gm/dL


 


Albumin/Globulin Ratio   (1-2)  


 


Urine Color   (Yellow)  


 


Urine Appearance   (Clear)  


 


Urine pH   (5.0-8.0)  


 


Ur Specific Gravity   (1.005-1.030)  


 


Urine Protein   (Negative)  


 


Urine Glucose (UA)   (Negative)  


 


Urine Ketones   (Negative)  


 


Urine Occult Blood   (Negative)  


 


Urine Nitrite   (Negative)  


 


Urine Bilirubin   (Negative)  


 


Urine Urobilinogen   (0.2-1.0)  


 


Ur Leukocyte Esterase   (Negative)  


 


Urine RBC   (0-5)  /hpf


 


Urine WBC   (0-5)  /hpf


 


Ur Squamous Epith Cells   (0-5)  /hpf


 


Urine Bacteria   (FEW)  /hpf


 


Urine Mucus   (FEW)  /hpf











Meds: 


Medications











Generic Name Dose Route Start Last Admin





  Trade Name Sharla  PRN Reason Stop Dose Admin


 


Sodium Chloride  1,000 mls @ 150 mls/hr  09/29/21 21:23  09/29/21 21:38





  Normal Saline  IV  09/30/21 04:02  150 mls/hr





  NOW STA   Administration


 


Sodium Chloride  10 ml  09/29/21 21:13  09/29/21 21:41





  Sodium Chloride 0.9% 10 Ml Syringe  FLUSH   10 ml





  ASDIRECTED PRN   Administration





  Keep Vein Open  














Discontinued Medications














Generic Name Dose Route Start Last Admin





  Trade Name Sharla  PRN Reason Stop Dose Admin


 


Hydromorphone HCl  0.5 mg  09/29/21 21:23  09/29/21 21:39





  Hydromorphone 0.5 Mg/0.5 Ml Syringe  IVPUSH  09/29/21 21:24  0.5 mg





  ONETIME ONE   Administration


 


Hydromorphone HCl  0.5 mg  09/29/21 22:38  09/29/21 22:55





  Hydromorphone 0.5 Mg/0.5 Ml Syringe  IVPUSH  09/29/21 22:39  0.5 mg





  ONETIME ONE   Administration


 


Ketorolac Tromethamine  30 mg  09/29/21 21:23  09/29/21 21:38





  Ketorolac 30 Mg/Ml Sdv  IVPUSH  09/29/21 21:24  30 mg





  ONETIME ONE   Administration


 


Ondansetron HCl  4 mg  09/29/21 21:23  09/29/21 21:39





  Ondansetron 4 Mg/2 Ml Sdv  IVPUSH  09/29/21 21:24  4 mg





  ONETIME ONE   Administration














- Re-Assessments/Exams


Free Text/Narrative Re-Assessment/Exam: 





09/30/21 02:01. Has had good relief from IV meds.  renal CT shows some 

dilitation of the R ureter but no visualized stone.  It could be a noncalcified 

stone or maybe she has already passed a stone.  She feels safe to drive home 

now,  ambulated to and from bathroom without difficulty.  Last dose of dilaudid 

was several hrs ago.  





Free Text/Narrative Re-Assessment/Exam: 





09/29/21 23:46


Have assumed care from Stephanie AMAYA following change of shift.  I agree with her hx 

and exam as documented.  CT report is now back, shows very mild dilitation of 

the R ureter adn renal collecting system with no definite stone visualized.  See

 Radiology report for details.  Her sx very strongly suggestive for R sided 

stone.  Ua does does not show infection.  She feels comfortable now with 2 doses

 of dilaudid.  She lives out of town, her  is out of town so does not 

have a .  Will watch her here in the ED until the dilaudid wears off.  

Discharge instr. as documented.  





Departure





- Departure


Time of Disposition: 02:10


Condition: Fair





Sepsis Event Note (ED)





- Focused Exam


Vital Signs: 


                                   Vital Signs











  Temp Pulse Resp BP Pulse Ox


 


 09/29/21 21:17  98.0 F  96  20  139/83  97

## 2021-09-30 NOTE — CT
CT abdomen and pelvis

 

Technique: Multiple axial sections were obtained from above the dome 

of the diaphragm inferiorly through the pubic symphysis.  Intravenous 

and oral contrast were not utilized.  Study has been performed as a 

ureteral stone protocol.  Reconstructed coronal and sagittal images 

were obtained.

 

Comparison: Prior CT abdomen and pelvis study of 10/05/18.

 

Findings: Visualized lung bases show nothing acute.

 

Kidneys show no hydronephrosis.  Small nonobstructing 2 mm stone is 

noted within the lower left kidney.  Small left-sided adrenal nodule 

is noted which is felt to be stable from prior exam and measures 9 mm 

which is most likely benign.  No ureteral dilatation or ureteral stone

 is seen.  No bladder calculi are seen.

 

Noncontrast liver shows no focal abnormality.  Spleen size is normal. 

 Pancreas shows no discrete abnormality.  Gallbladder contains no 

calcified gallstones.  Abdominal aorta shows atherosclerotic 

calcification with no aneurysm.  No retroperitoneal adenopathy or 

mesenteric abnormalities are seen.  Appendix is seen which is normal 

in size.  No pelvic mass or adenopathy is seen.  Prior hysterectomy is

 noted.

 

Bone window settings were reviewed which show mild scattered 

degenerative change within the spine, most severe at L4-5.

 

Impression:

1.  No ureteral dilatation or ureteral stone is seen.  Small 

nonobstructing calculus measuring 2 mm is seen within the lower left 

kidney.

2.  Small stable nodule within the left adrenal gland compatible with 

benign etiology.

3.  Degenerative change within the spine as noted above.

 

Diagnostic code #2

 

I agree with preliminary report from [facility], finalized on 09/30/21, 

12:20 AM CDT, code 1